# Patient Record
Sex: MALE | Race: WHITE | Employment: FULL TIME | URBAN - METROPOLITAN AREA
[De-identification: names, ages, dates, MRNs, and addresses within clinical notes are randomized per-mention and may not be internally consistent; named-entity substitution may affect disease eponyms.]

---

## 2017-12-11 ENCOUNTER — HOSPITAL ENCOUNTER (OUTPATIENT)
Age: 42
Discharge: HOME | End: 2017-12-11
Attending: FAMILY MEDICINE
Payer: COMMERCIAL

## 2017-12-11 VITALS
DIASTOLIC BLOOD PRESSURE: 90 MMHG | HEIGHT: 76 IN | SYSTOLIC BLOOD PRESSURE: 143 MMHG | RESPIRATION RATE: 18 BRPM | TEMPERATURE: 98 F | HEART RATE: 74 BPM | WEIGHT: 300 LBS | BODY MASS INDEX: 36.53 KG/M2 | OXYGEN SATURATION: 96 %

## 2017-12-11 DIAGNOSIS — M54.16 LEFT LUMBAR RADICULOPATHY: Primary | ICD-10-CM

## 2017-12-11 DIAGNOSIS — S39.012A LUMBAR STRAIN, INITIAL ENCOUNTER: ICD-10-CM

## 2017-12-11 PROCEDURE — 99203 OFFICE O/P NEW LOW 30 MIN: CPT

## 2017-12-11 RX ORDER — COVID-19 ANTIGEN TEST
220 KIT MISCELLANEOUS
COMMUNITY
End: 2017-12-11

## 2017-12-11 RX ORDER — ESOMEPRAZOLE MAGNESIUM 20 MG/1
20 FOR SUSPENSION ORAL
COMMUNITY

## 2017-12-11 RX ORDER — IBUPROFEN 800 MG/1
800 TABLET ORAL ONCE
Status: COMPLETED | OUTPATIENT
Start: 2017-12-11 | End: 2017-12-11

## 2017-12-11 RX ORDER — NAPROXEN 500 MG/1
500 TABLET ORAL 2 TIMES DAILY PRN
Qty: 20 TABLET | Refills: 0 | Status: SHIPPED | OUTPATIENT
Start: 2017-12-11 | End: 2017-12-18

## 2017-12-11 RX ORDER — CYCLOBENZAPRINE HCL 10 MG
10 TABLET ORAL NIGHTLY
Qty: 7 TABLET | Refills: 0 | Status: SHIPPED | OUTPATIENT
Start: 2017-12-11 | End: 2017-12-18

## 2017-12-11 RX ORDER — SERTRALINE HYDROCHLORIDE 100 MG/1
100 TABLET, FILM COATED ORAL DAILY
COMMUNITY
End: 2021-01-13

## 2017-12-11 NOTE — ED INITIAL ASSESSMENT (HPI)
Pt c/o left sided lower back pain that started yesterday after installing a water heater over the weekend. Pt states that he thinks he injured his back while lifting the water heater.   Pt states the pain is on the lower left side of his back and travels d

## 2017-12-12 NOTE — ED PROVIDER NOTES
Patient Seen in: [facility]    History   Patient presents with:  Back Pain (musculoskeletal)    Stated Complaint: Lower back pain x 1 day    HPI    79-year-old gentleman with a past medical history of chronic neck, back, an Physical Exam    General: Pleasant male in no acute distress   Skin: Lesions, no erythema, no rashes   Vascular: Pulses intact   Neuro: No paresthesias   BACK: Normal Appearance  No Midline Lumbar TTP. No right paraspinal Lumbar TTP.   Left paraspin

## 2019-02-15 ENCOUNTER — HOSPITAL ENCOUNTER (OUTPATIENT)
Dept: GENERAL RADIOLOGY | Facility: HOSPITAL | Age: 44
Discharge: HOME | End: 2019-02-15
Attending: ORTHOPAEDIC SURGERY
Payer: COMMERCIAL

## 2019-02-15 DIAGNOSIS — M25.552 PAIN IN LEFT HIP: ICD-10-CM

## 2019-02-15 PROCEDURE — 73502 X-RAY EXAM HIP UNI 2-3 VIEWS: CPT | Performed by: ORTHOPAEDIC SURGERY

## 2019-07-19 ENCOUNTER — HOSPITAL ENCOUNTER (OUTPATIENT)
Dept: GENERAL RADIOLOGY | Facility: HOSPITAL | Age: 44
Discharge: HOME | End: 2019-07-19
Attending: ORTHOPAEDIC SURGERY
Payer: COMMERCIAL

## 2019-07-19 DIAGNOSIS — Z96.642 HISTORY OF TOTAL HIP REPLACEMENT, LEFT: ICD-10-CM

## 2019-07-19 PROCEDURE — 73502 X-RAY EXAM HIP UNI 2-3 VIEWS: CPT | Performed by: ORTHOPAEDIC SURGERY

## 2019-11-22 ENCOUNTER — HOSPITAL ENCOUNTER (OUTPATIENT)
Dept: GENERAL RADIOLOGY | Facility: HOSPITAL | Age: 44
Discharge: HOME | End: 2019-11-22
Attending: ORTHOPAEDIC SURGERY
Payer: COMMERCIAL

## 2019-11-22 DIAGNOSIS — Z96.642 HISTORY OF TOTAL LEFT HIP ARTHROPLASTY: ICD-10-CM

## 2019-11-22 PROCEDURE — 73502 X-RAY EXAM HIP UNI 2-3 VIEWS: CPT | Performed by: ORTHOPAEDIC SURGERY

## 2020-07-17 ENCOUNTER — HOSPITAL ENCOUNTER (OUTPATIENT)
Dept: GENERAL RADIOLOGY | Facility: HOSPITAL | Age: 45
Discharge: HOME | End: 2020-07-17
Attending: ORTHOPAEDIC SURGERY
Payer: COMMERCIAL

## 2020-07-17 DIAGNOSIS — Z96.641 HISTORY OF TOTAL RIGHT HIP ARTHROPLASTY: ICD-10-CM

## 2020-07-17 PROCEDURE — 73522 X-RAY EXAM HIPS BI 3-4 VIEWS: CPT | Performed by: ORTHOPAEDIC SURGERY

## 2020-09-08 ENCOUNTER — OFFICE VISIT (OUTPATIENT)
Dept: NEUROLOGY | Facility: CLINIC | Age: 45
End: 2020-09-08
Payer: COMMERCIAL

## 2020-09-08 ENCOUNTER — HOSPITAL ENCOUNTER (OUTPATIENT)
Dept: GENERAL RADIOLOGY | Age: 45
Discharge: HOME | End: 2020-09-08
Attending: PHYSICAL MEDICINE & REHABILITATION
Payer: COMMERCIAL

## 2020-09-08 VITALS — BODY MASS INDEX: 35.42 KG/M2 | HEIGHT: 77 IN | WEIGHT: 300 LBS

## 2020-09-08 DIAGNOSIS — M54.16 ACUTE LEFT LUMBAR RADICULOPATHY: ICD-10-CM

## 2020-09-08 DIAGNOSIS — M62.9 HAMSTRING TIGHTNESS OF BOTH LOWER EXTREMITIES: ICD-10-CM

## 2020-09-08 DIAGNOSIS — Z96.643 HISTORY OF HIP REPLACEMENT, TOTAL, BILATERAL: ICD-10-CM

## 2020-09-08 DIAGNOSIS — R29.3 POOR POSTURE: ICD-10-CM

## 2020-09-08 DIAGNOSIS — M54.16 ACUTE LEFT LUMBAR RADICULOPATHY: Primary | ICD-10-CM

## 2020-09-08 DIAGNOSIS — G47.9 SLEEP DISTURBANCE: ICD-10-CM

## 2020-09-08 PROCEDURE — 3008F BODY MASS INDEX DOCD: CPT | Performed by: PHYSICAL MEDICINE & REHABILITATION

## 2020-09-08 PROCEDURE — 72114 X-RAY EXAM L-S SPINE BENDING: CPT | Performed by: PHYSICAL MEDICINE & REHABILITATION

## 2020-09-08 PROCEDURE — 99244 OFF/OP CNSLTJ NEW/EST MOD 40: CPT | Performed by: PHYSICAL MEDICINE & REHABILITATION

## 2020-09-08 RX ORDER — TRAMADOL HYDROCHLORIDE 50 MG/1
50 TABLET ORAL EVERY 6 HOURS PRN
COMMUNITY
Start: 2020-09-06 | End: 2020-09-29

## 2020-09-08 RX ORDER — ATORVASTATIN CALCIUM 10 MG/1
10 TABLET, FILM COATED ORAL DAILY
COMMUNITY
Start: 2020-06-04 | End: 2021-10-27

## 2020-09-08 RX ORDER — METHYLPREDNISOLONE 4 MG/1
TABLET ORAL
Qty: 1 PACKAGE | Refills: 0 | Status: SHIPPED | OUTPATIENT
Start: 2020-09-08 | End: 2020-09-29

## 2020-09-08 RX ORDER — GABAPENTIN 300 MG/1
CAPSULE ORAL
Qty: 90 CAPSULE | Refills: 0 | Status: SHIPPED | OUTPATIENT
Start: 2020-09-08 | End: 2020-09-23

## 2020-09-08 NOTE — PROGRESS NOTES
[address]  NEW PATIENT EVALUATION    Consultation as a request of Dr. Latrell Angulo    Chief Complaint: back pain.     HISTORY OF PRESENT ILLNESS:   Patient presents with:  Leg Pain: Patient presents today c/o scia saddle anesthesia. He has not had any recent imaging or physical therapy or other treatment. Patient works as a  and has to do some manual work but continues to work at this time.         PAST MEDICAL HISTORY:     Past Medical History:   Diagnos denies  Heartburn: denies  Abdominal Pain: denies  Blood in Stool : denies   Hematology  Easy Bruising: denies  Easy Bleeding: denies   Genitourinary  Difficulty Urinating: denies  Bladder Incontinence: denies  Pelvic Pain: denies  Painful Urination: denie radicular pain symptoms  Slump test: Positive for pain symptoms for radicular pain symptoms        LABS:   No results found for: EAG, A1C  No results found for: WBC, RBC, HGB, HCT, MCV, MCH, MCHC, RDW, PLT, MPV  No results found for: GLU, BUN, BUNCREA, CRE Rehabilitation/Sports Medicine

## 2020-09-08 NOTE — PATIENT INSTRUCTIONS
-Xray of the lumbar on the way out today  -Start PT and home exercises   -Start Medrol dose pack   -Start Gabapentin and increase slowly  -Stop Tramadol since it's not helpful, can take as needed if needed  -Ice/heat for the back   -Follow up in 4 weeks  -

## 2020-09-20 ENCOUNTER — PATIENT MESSAGE (OUTPATIENT)
Dept: NEUROLOGY | Facility: CLINIC | Age: 45
End: 2020-09-20

## 2020-09-20 DIAGNOSIS — M54.16 ACUTE LEFT LUMBAR RADICULOPATHY: Primary | ICD-10-CM

## 2020-09-21 ENCOUNTER — TELEPHONE (OUTPATIENT)
Dept: NEUROLOGY | Facility: CLINIC | Age: 45
End: 2020-09-21

## 2020-09-21 NOTE — TELEPHONE ENCOUNTER
Please have patient complete MRI of the lumbar spine and follow up with me right after so we can discuss further.

## 2020-09-21 NOTE — TELEPHONE ENCOUNTER
Patient c/o 9/10 LBP radiating to LLE, worse in the morning. Has been taking Gabapentin TID, does not feel like it is helping. Tramadol helps a little but he only has 2 pills left. He started PT last week. Patient requesting medication for pain.  Please adv

## 2020-09-21 NOTE — TELEPHONE ENCOUNTER
[employer] for authorization of approval for MRI L-spine wo cpt code 09814. Approval was given with Authorization # [id number] effective 09/21/20 to 10/20/20. Will call Pt. to inform. L/m advising of approval. Can proceed with scheduling appt.

## 2020-09-21 NOTE — TELEPHONE ENCOUNTER
From: Nghia Learn  To: Alber Alexandra. Tala Corley DO  Sent: 9/20/2020 9:11 AM CDT  Subject: Other    Meir Terrell and a patient of Dr. Tala Corley. I’m in [place] for the weekend. Having really bad Sciatica and the Gabopention/Tramadol is not working. In a lot of pain.

## 2020-09-22 ENCOUNTER — PATIENT MESSAGE (OUTPATIENT)
Dept: NEUROLOGY | Facility: CLINIC | Age: 45
End: 2020-09-22

## 2020-09-22 NOTE — TELEPHONE ENCOUNTER
From: Mariah Mireles  To: Lord Sim. Anaya Selby DO  Sent: 9/22/2020 3:50 AM CDT  Subject: Other    Haven't been able to sleep for 3 days. Sent message Saturday or Sunday. Getting MRI Wednesday. What can I take for the Pain? ? Breanna Coello not working.  Stretching is not

## 2020-09-23 ENCOUNTER — HOSPITAL ENCOUNTER (OUTPATIENT)
Dept: MRI IMAGING | Facility: HOSPITAL | Age: 45
Discharge: HOME | End: 2020-09-23
Attending: PHYSICAL MEDICINE & REHABILITATION
Payer: COMMERCIAL

## 2020-09-23 DIAGNOSIS — M54.16 ACUTE LEFT LUMBAR RADICULOPATHY: ICD-10-CM

## 2020-09-23 PROCEDURE — 72148 MRI LUMBAR SPINE W/O DYE: CPT | Performed by: PHYSICAL MEDICINE & REHABILITATION

## 2020-09-23 RX ORDER — GABAPENTIN 800 MG/1
800 TABLET ORAL 3 TIMES DAILY
Qty: 90 TABLET | Refills: 0 | Status: SHIPPED | OUTPATIENT
Start: 2020-09-23 | End: 2021-04-22

## 2020-09-23 NOTE — TELEPHONE ENCOUNTER
Left detailed message informing patient that Dr. Sunitha Waller prescribed a higher dose of gabapentin to take. Instructed patient to call our office to make a sooner appt to go over MRI with Dr. Sunitha Waller and discuss treatment options.

## 2020-09-24 ENCOUNTER — TELEPHONE (OUTPATIENT)
Dept: NEUROLOGY | Facility: CLINIC | Age: 45
End: 2020-09-24

## 2020-09-24 DIAGNOSIS — M54.16 ACUTE LEFT LUMBAR RADICULOPATHY: Primary | ICD-10-CM

## 2020-09-24 NOTE — TELEPHONE ENCOUNTER
Left voicemail for the patient explaining MRI findings and discussed that he should try to schedule an appointment with me today either at 1130 or we can schedule him for a video visit later this evening to discuss his MRI findings and further treatment op

## 2020-09-29 ENCOUNTER — TELEPHONE (OUTPATIENT)
Dept: NEUROLOGY | Facility: CLINIC | Age: 45
End: 2020-09-29

## 2020-09-29 ENCOUNTER — OFFICE VISIT (OUTPATIENT)
Dept: NEUROLOGY | Facility: CLINIC | Age: 45
End: 2020-09-29
Payer: COMMERCIAL

## 2020-09-29 DIAGNOSIS — M54.16 LUMBAR RADICULOPATHY, CHRONIC: Primary | ICD-10-CM

## 2020-09-29 DIAGNOSIS — Z96.643 HISTORY OF HIP REPLACEMENT, TOTAL, BILATERAL: ICD-10-CM

## 2020-09-29 DIAGNOSIS — M62.9 HAMSTRING TIGHTNESS OF BOTH LOWER EXTREMITIES: ICD-10-CM

## 2020-09-29 DIAGNOSIS — G47.9 SLEEP DISTURBANCE: ICD-10-CM

## 2020-09-29 PROCEDURE — 99214 OFFICE O/P EST MOD 30 MIN: CPT | Performed by: PHYSICAL MEDICINE & REHABILITATION

## 2020-09-29 RX ORDER — TRAMADOL HYDROCHLORIDE 50 MG/1
50 TABLET ORAL EVERY 8 HOURS PRN
Qty: 21 TABLET | Refills: 0 | Status: SHIPPED | OUTPATIENT
Start: 2020-09-29 | End: 2020-10-06

## 2020-09-29 NOTE — PROGRESS NOTES
[address] [facility]  FOLLOW UP EVALUATION      Chief Complaint: back pain. HISTORY OF PRESENT ILLNESS:   Patient presents with:  Low Back Pain: LOV: 9/8/20. F/U on left lower back and leg pain.  Patient states that shooting pain in the low back with radiation to the buttock, posterior thigh and calf as well as numbness sensation in the foot. He was in [place] and went to the ER urgently where he was given tramadol medication and discharged to follow-up.   Since the family history.        SOCIAL HISTORY:   Social History    Tobacco Use      Smoking status: Never Smoker      Smokeless tobacco: Never Used    Alcohol use: Not on file    Drug use: Not on file         REVIEW OF SYSTEMS:   Patient-reported ROS  [place] Positive for pain symptoms for radicular pain symptoms        LABS:   No results found for: EAG, A1C  No results found for: WBC, RBC, HGB, HCT, MCV, MCH, MCHC, RDW, PLT, MPV  No results found for: GLU, BUN, BUNCREA, CREATSERUM, ANIONGAP, GFR, GFRNAA, GFRAA steroid injection nephroscopy guidance under local anesthesia. Advised the patient my office will call him once the injection is approved. In the meantime should continue gabapentin and take tramadol as needed.   If he does not have sufficient improvement

## 2020-09-29 NOTE — PATIENT INSTRUCTIONS
-My office will call once injection is approved  -Continue PT and home exercises  -Increase Gabapentin to 4x day if needed  -Tramadol as needed until procedure  -Follow up 4 weeks after injection

## 2020-09-29 NOTE — TELEPHONE ENCOUNTER
Called [facility] for authorization of approval of Left L5-S1 and S1 Transforaminal Epidural Steroid Injection under fluoroscopy cpt codes  [address]337, [address]  [address]. Talked to [address]. who initiated request. PA #  [id number]. Will fax clinical note.  Faxed note pending a

## 2020-10-07 ENCOUNTER — MED REC SCAN ONLY (OUTPATIENT)
Dept: NEUROLOGY | Facility: CLINIC | Age: 45
End: 2020-10-07

## 2020-10-23 NOTE — TELEPHONE ENCOUNTER
Offered to schedule patient for injection. Patient states at this point he has been doing PT and its going really well. He is going to wait until PT is over to decide if he wants to proceed with injection.

## 2020-10-23 NOTE — TELEPHONE ENCOUNTER
Called [facility] to check on status for authorization of approval of Left L5-S1 and S1 Transforaminal Epidural Steroid Injection under fluoroscopy. PA #  [id number]. T/t Domitila GALVIN who states it was approved.  Authorization for one unit/DOS # [id number] effective 09/29

## 2021-01-13 PROBLEM — F33.1 RECURRENT MAJOR DEPRESSIVE EPISODES, MODERATE (HCC): Status: ACTIVE | Noted: 2021-01-13

## 2021-01-13 PROBLEM — F50.81 BINGE EATING DISORDER: Status: ACTIVE | Noted: 2021-01-13

## 2021-04-20 ENCOUNTER — TELEPHONE (OUTPATIENT)
Dept: NEUROLOGY | Facility: CLINIC | Age: 46
End: 2021-04-20

## 2021-04-21 RX ORDER — GABAPENTIN 800 MG/1
TABLET ORAL
Qty: 90 TABLET | Refills: 0 | OUTPATIENT
Start: 2021-04-21

## 2021-04-22 ENCOUNTER — OFFICE VISIT (OUTPATIENT)
Dept: NEUROLOGY | Facility: CLINIC | Age: 46
End: 2021-04-22
Payer: COMMERCIAL

## 2021-04-22 VITALS — HEART RATE: 77 BPM | WEIGHT: 300 LBS | BODY MASS INDEX: 36.53 KG/M2 | OXYGEN SATURATION: 97 % | HEIGHT: 76 IN

## 2021-04-22 DIAGNOSIS — M54.16 LUMBAR RADICULOPATHY, CHRONIC: Primary | ICD-10-CM

## 2021-04-22 DIAGNOSIS — Z96.643 HISTORY OF HIP REPLACEMENT, TOTAL, BILATERAL: ICD-10-CM

## 2021-04-22 DIAGNOSIS — G47.9 SLEEP DISTURBANCE: ICD-10-CM

## 2021-04-22 DIAGNOSIS — M62.9 HAMSTRING TIGHTNESS OF BOTH LOWER EXTREMITIES: ICD-10-CM

## 2021-04-22 PROCEDURE — 99213 OFFICE O/P EST LOW 20 MIN: CPT | Performed by: PHYSICAL MEDICINE & REHABILITATION

## 2021-04-22 PROCEDURE — 3008F BODY MASS INDEX DOCD: CPT | Performed by: PHYSICAL MEDICINE & REHABILITATION

## 2021-04-22 RX ORDER — GABAPENTIN 800 MG/1
800 TABLET ORAL 2 TIMES DAILY PRN
Qty: 180 TABLET | Refills: 0 | Status: SHIPPED | OUTPATIENT
Start: 2021-04-22 | End: 2021-12-23

## 2021-04-22 NOTE — PROGRESS NOTES
[address] [facility]  FOLLOW UP EVALUATION      Chief Complaint: back pain.     HISTORY OF PRESENT ILLNESS:   Patient presents with:  Low Back Pain: LOV: 9/29/20 Pt f/u on L low back pain that radiates to L leg with N/ provide good improvement however has not had any recent interventional procedures.     H&P:  The patient is a 54year old male with significant past medical history of ADHD, depression, GERD, bilateral hip replacements and hip osteoarthritis who presents wi tablet 0   • Lisdexamfetamine Dimesylate (VYVANSE) 30 MG Oral Cap Take 1 capsule (30 mg total) by mouth every morning. 30 capsule 0   • Sertraline HCl 100 MG Oral Tab Take 1 tablet (100 mg total) by mouth daily.  90 tablet 1   • [START ON 6/14/2021] Lisdexa Skin: No lesions noted   Cognition: alert & oriented x 3, attentive, able to follow 2 step commands, comprehention intact, spontaneous speech intact  Psychiatric: Mood and affect appropriate    Gait Normal  Able to toe walk and heel walk without any diff 58-year-old gentleman who presents today for follow-up evaluation of bilateral lumbar radiculopathy. He has spinal stenosis at L4-5 level. His pain is mild in nature at this time and well-tolerated gabapentin.   I have given him a refill to take gabapenti

## 2021-06-21 NOTE — TELEPHONE ENCOUNTER
Duplicate request
Medication request: Gabapentin 800 mg oral tab Take 1 tablet by mouth TID qty 90     LOV 09/29/20  NOV- none    [place]: 09/23/2020
[Follow-Up: _____] : a [unfilled] follow-up visit

## 2021-07-22 ENCOUNTER — OFFICE VISIT (OUTPATIENT)
Dept: NEUROLOGY | Facility: CLINIC | Age: 46
End: 2021-07-22
Payer: COMMERCIAL

## 2021-07-22 ENCOUNTER — TELEPHONE (OUTPATIENT)
Dept: NEUROLOGY | Facility: CLINIC | Age: 46
End: 2021-07-22

## 2021-07-22 VITALS
HEART RATE: 63 BPM | HEIGHT: 76 IN | BODY MASS INDEX: 36.53 KG/M2 | SYSTOLIC BLOOD PRESSURE: 124 MMHG | WEIGHT: 300 LBS | OXYGEN SATURATION: 96 % | DIASTOLIC BLOOD PRESSURE: 72 MMHG

## 2021-07-22 DIAGNOSIS — Z96.643 HISTORY OF HIP REPLACEMENT, TOTAL, BILATERAL: ICD-10-CM

## 2021-07-22 DIAGNOSIS — M54.16 LUMBAR RADICULOPATHY, CHRONIC: ICD-10-CM

## 2021-07-22 DIAGNOSIS — M48.061 SPINAL STENOSIS AT L4-L5 LEVEL: Primary | ICD-10-CM

## 2021-07-22 PROCEDURE — 3008F BODY MASS INDEX DOCD: CPT | Performed by: PHYSICAL MEDICINE & REHABILITATION

## 2021-07-22 PROCEDURE — 3074F SYST BP LT 130 MM HG: CPT | Performed by: PHYSICAL MEDICINE & REHABILITATION

## 2021-07-22 PROCEDURE — 99214 OFFICE O/P EST MOD 30 MIN: CPT | Performed by: PHYSICAL MEDICINE & REHABILITATION

## 2021-07-22 PROCEDURE — 3078F DIAST BP <80 MM HG: CPT | Performed by: PHYSICAL MEDICINE & REHABILITATION

## 2021-07-22 NOTE — PROGRESS NOTES
[address] [facility]  FOLLOW UP EVALUATION      Chief Complaint: back pain.     HISTORY OF PRESENT ILLNESS:   Patient presents with:  Low Back Pain: LOV: 4/22/21 Patient f/u on L low back pain that radiates to posterio down the posterior aspect of the thigh and calf as well as some numbness tingling along the first webspace of this left foot. He denies any changes in strength, sensation or bowel bladder habits.   His pain is worse with increased activity and improves wit HISTORY:     Past Medical History:   Diagnosis Date   • ADHD    • Back complaints    • Depression    • GERD (gastroesophageal reflux disease)          PAST SURGICAL HISTORY:     Past Surgical History:   Procedure Laterality Date   • HIP REPLACEMENT SURGERY PHYSICAL EXAM:   /72   Pulse 63   Ht 76\"   Wt 300 lb (136.1 kg)   SpO2 96%   BMI 36.52 kg/m²   General: No immediate distress  Head: Normocephalic/ Atraumatic  Eyes: Extra-occular movements intact.    Ears: No auricular hematoma or deformities significantly greater than expected for the patient's age. This is most prominent at L4-L5 where there is severe spinal canal stenosis and severe bilateral neural   foraminal stenosis. All imaging results were reviewed and discussed with patient.

## 2021-07-22 NOTE — TELEPHONE ENCOUNTER
Christine Bryant at [employer] Case/Reference #[id number] to initiate authorization for L5-S1 Interlaminar Epidural Steroid Injection under fluoroscopy guidance CPT 99977 dx:M48.061 to be done at [facility].   Coverage is active  [employer] requested clin

## 2021-07-26 NOTE — TELEPHONE ENCOUNTER
Patient has been scheduled for a L5-S1 Interlaminar Epidural Steroid Injection under fluoroscopy guidance   on 8/2/21 at the [facility]. Medications and allergies reviewed.  Patient informed we will need verbal or written authorization from patients Primary Care

## 2021-07-26 NOTE — TELEPHONE ENCOUNTER
L5-S1 Interlaminar Epidural Steroid Injection under fluoroscopy guidance CPT 10808 dx:M48.061 to be done at [address] [place] [address] fax from [facility], request is authorized.   Authorization # [id number] valid 7/2/21 thu 1/23/22  CAROLINE approved referrals notifed for

## 2021-08-02 ENCOUNTER — TELEPHONE (OUTPATIENT)
Dept: NEUROLOGY | Facility: CLINIC | Age: 46
End: 2021-08-02

## 2021-08-02 ENCOUNTER — OFFICE VISIT (OUTPATIENT)
Dept: SURGERY | Facility: CLINIC | Age: 46
End: 2021-08-02

## 2021-08-02 DIAGNOSIS — M48.061 SPINAL STENOSIS AT L4-L5 LEVEL: Primary | ICD-10-CM

## 2021-08-02 PROCEDURE — 62323 NJX INTERLAMINAR LMBR/SAC: CPT | Performed by: PHYSICAL MEDICINE & REHABILITATION

## 2021-08-02 NOTE — PROCEDURES
[address]    LUMBAR INTERLAMINAR  NAME:  Jacki Conrad    MR #:    [id number] :  11/15/1975     PHYSICIAN:  Selina Means        Operative Report    DATE OF PROCEDURE: 2021   PREOPERATIVE DIAGNOSES: 1.  Spinal stenosis at L4 whole procedure, prior to injection of any medication, aspiration was performed. No blood, fluid, or air was aspirated at anytime.     Satish Rodriguez DO, FAAPMR & CAQSM  Physical Medicine and Rehabilitation/Sports Medicine  [facility]

## 2021-12-23 ENCOUNTER — TELEPHONE (OUTPATIENT)
Dept: NEUROLOGY | Facility: CLINIC | Age: 46
End: 2021-12-23

## 2021-12-23 RX ORDER — GABAPENTIN 800 MG/1
800 TABLET ORAL 2 TIMES DAILY PRN
Qty: 180 TABLET | Refills: 0 | Status: SHIPPED | OUTPATIENT
Start: 2021-12-23 | End: 2022-03-23

## 2021-12-23 NOTE — TELEPHONE ENCOUNTER
Medication request: gabapentin 800 MG Oral Tab   Sig:   Take 1 tablet (800 mg total) by mouth 2 (two) times daily as needed.   Qty#180R-0    LOV: 8/2/21  NOV: None    [place]: 4/22/21

## 2024-05-31 ENCOUNTER — TELEPHONE (OUTPATIENT)
Dept: FAMILY MEDICINE CLINIC | Facility: CLINIC | Age: 49
End: 2024-05-31

## 2024-05-31 NOTE — TELEPHONE ENCOUNTER
Received fax from [facility] regarding pt's office visit 05/20/24    Dr. Patton reviewed: please update Hx w/ basal cell cancer diagnosis. Thank you    Need to inquire if pt plans to follow Dr. Patton as PCP to [facility]